# Patient Record
Sex: FEMALE | Race: OTHER | HISPANIC OR LATINO | Employment: UNEMPLOYED | URBAN - METROPOLITAN AREA
[De-identification: names, ages, dates, MRNs, and addresses within clinical notes are randomized per-mention and may not be internally consistent; named-entity substitution may affect disease eponyms.]

---

## 2018-03-29 ENCOUNTER — HISTORICAL (OUTPATIENT)
Dept: RADIOLOGY | Facility: HOSPITAL | Age: 43
End: 2018-03-29

## 2018-07-27 ENCOUNTER — HISTORICAL (OUTPATIENT)
Dept: RADIOLOGY | Facility: HOSPITAL | Age: 43
End: 2018-07-27

## 2023-08-13 ENCOUNTER — HOSPITAL ENCOUNTER (OUTPATIENT)
Facility: HOSPITAL | Age: 48
Discharge: HOME | End: 2023-08-15
Attending: EMERGENCY MEDICINE | Admitting: SURGERY

## 2023-08-13 DIAGNOSIS — K37 APPENDICITIS, UNSPECIFIED APPENDICITIS TYPE: Primary | ICD-10-CM

## 2023-08-13 LAB
ALBUMIN SERPL-MCNC: 3.8 G/DL (ref 3.5–5)
ALBUMIN/GLOB SERPL: 1.1 RATIO (ref 1.1–2)
ALP SERPL-CCNC: 103 UNIT/L (ref 40–150)
ALT SERPL-CCNC: 38 UNIT/L (ref 0–55)
AMPHET UR QL SCN: NEGATIVE
APPEARANCE UR: CLEAR
AST SERPL-CCNC: 31 UNIT/L (ref 5–34)
B-HCG UR QL: NEGATIVE
BACTERIA #/AREA URNS AUTO: ABNORMAL /HPF
BARBITURATE SCN PRESENT UR: NEGATIVE
BASOPHILS # BLD AUTO: 0.02 X10(3)/MCL
BASOPHILS NFR BLD AUTO: 0.1 %
BENZODIAZ UR QL SCN: NEGATIVE
BILIRUB SERPL-MCNC: 0.7 MG/DL
BILIRUB UR QL STRIP.AUTO: NEGATIVE
BUN SERPL-MCNC: 12.3 MG/DL (ref 7–18.7)
CALCIUM SERPL-MCNC: 9.3 MG/DL (ref 8.4–10.2)
CANNABINOIDS UR QL SCN: NEGATIVE
CHLORIDE SERPL-SCNC: 109 MMOL/L (ref 98–107)
CO2 SERPL-SCNC: 18 MMOL/L (ref 22–29)
COCAINE UR QL SCN: NEGATIVE
COLOR UR: ABNORMAL
CREAT SERPL-MCNC: 0.72 MG/DL (ref 0.55–1.02)
CTP QC/QA: YES
EOSINOPHIL # BLD AUTO: 0.01 X10(3)/MCL (ref 0–0.9)
EOSINOPHIL NFR BLD AUTO: 0.1 %
ERYTHROCYTE [DISTWIDTH] IN BLOOD BY AUTOMATED COUNT: 12.1 % (ref 11.5–17)
FENTANYL UR QL SCN: NEGATIVE
GFR SERPLBLD CREATININE-BSD FMLA CKD-EPI: >60 MLS/MIN/1.73/M2
GLOBULIN SER-MCNC: 3.6 GM/DL (ref 2.4–3.5)
GLUCOSE SERPL-MCNC: 119 MG/DL (ref 74–100)
GLUCOSE UR QL STRIP.AUTO: NORMAL
HCT VFR BLD AUTO: 43.6 % (ref 37–47)
HGB BLD-MCNC: 15.7 G/DL (ref 12–16)
HYALINE CASTS #/AREA URNS LPF: ABNORMAL /LPF
IMM GRANULOCYTES # BLD AUTO: 0.05 X10(3)/MCL (ref 0–0.04)
IMM GRANULOCYTES NFR BLD AUTO: 0.3 %
KETONES UR QL STRIP.AUTO: NEGATIVE
LEUKOCYTE ESTERASE UR QL STRIP.AUTO: NEGATIVE
LIPASE SERPL-CCNC: 19 U/L
LYMPHOCYTES # BLD AUTO: 1.57 X10(3)/MCL (ref 0.6–4.6)
LYMPHOCYTES NFR BLD AUTO: 10.7 %
MCH RBC QN AUTO: 29.8 PG (ref 27–31)
MCHC RBC AUTO-ENTMCNC: 36 G/DL (ref 33–36)
MCV RBC AUTO: 82.7 FL (ref 80–94)
MDMA UR QL SCN: NEGATIVE
MONOCYTES # BLD AUTO: 0.58 X10(3)/MCL (ref 0.1–1.3)
MONOCYTES NFR BLD AUTO: 4 %
MUCOUS THREADS URNS QL MICRO: ABNORMAL /LPF
NEUTROPHILS # BLD AUTO: 12.38 X10(3)/MCL (ref 2.1–9.2)
NEUTROPHILS NFR BLD AUTO: 84.8 %
NITRITE UR QL STRIP.AUTO: NEGATIVE
NRBC BLD AUTO-RTO: 0 %
OPIATES UR QL SCN: NEGATIVE
PCP UR QL: NEGATIVE
PH UR STRIP.AUTO: 6 [PH]
PH UR: 6 [PH] (ref 3–11)
PLATELET # BLD AUTO: 202 X10(3)/MCL (ref 130–400)
PMV BLD AUTO: 10.5 FL (ref 7.4–10.4)
POTASSIUM SERPL-SCNC: 3.5 MMOL/L (ref 3.5–5.1)
PROT SERPL-MCNC: 7.4 GM/DL (ref 6.4–8.3)
PROT UR QL STRIP.AUTO: ABNORMAL
RBC # BLD AUTO: 5.27 X10(6)/MCL (ref 4.2–5.4)
RBC #/AREA URNS AUTO: ABNORMAL /HPF
RBC UR QL AUTO: ABNORMAL
SODIUM SERPL-SCNC: 138 MMOL/L (ref 136–145)
SP GR UR STRIP.AUTO: 1.02
SQUAMOUS #/AREA URNS LPF: ABNORMAL /HPF
UROBILINOGEN UR STRIP-ACNC: NORMAL
WBC # SPEC AUTO: 14.61 X10(3)/MCL (ref 4.5–11.5)
WBC #/AREA URNS AUTO: ABNORMAL /HPF

## 2023-08-13 PROCEDURE — 25000003 PHARM REV CODE 250: Performed by: NURSE PRACTITIONER

## 2023-08-13 PROCEDURE — 80053 COMPREHEN METABOLIC PANEL: CPT | Performed by: NURSE PRACTITIONER

## 2023-08-13 PROCEDURE — 25500020 PHARM REV CODE 255

## 2023-08-13 PROCEDURE — 96375 TX/PRO/DX INJ NEW DRUG ADDON: CPT

## 2023-08-13 PROCEDURE — 81001 URINALYSIS AUTO W/SCOPE: CPT | Performed by: NURSE PRACTITIONER

## 2023-08-13 PROCEDURE — 25000003 PHARM REV CODE 250: Performed by: STUDENT IN AN ORGANIZED HEALTH CARE EDUCATION/TRAINING PROGRAM

## 2023-08-13 PROCEDURE — 63600175 PHARM REV CODE 636 W HCPCS: Performed by: NURSE PRACTITIONER

## 2023-08-13 PROCEDURE — 96365 THER/PROPH/DIAG IV INF INIT: CPT

## 2023-08-13 PROCEDURE — 63600175 PHARM REV CODE 636 W HCPCS: Performed by: STUDENT IN AN ORGANIZED HEALTH CARE EDUCATION/TRAINING PROGRAM

## 2023-08-13 PROCEDURE — 81025 URINE PREGNANCY TEST: CPT | Performed by: NURSE PRACTITIONER

## 2023-08-13 PROCEDURE — 80307 DRUG TEST PRSMV CHEM ANLYZR: CPT | Performed by: NURSE PRACTITIONER

## 2023-08-13 PROCEDURE — 99285 EMERGENCY DEPT VISIT HI MDM: CPT | Mod: 25

## 2023-08-13 PROCEDURE — 85025 COMPLETE CBC W/AUTO DIFF WBC: CPT | Performed by: NURSE PRACTITIONER

## 2023-08-13 PROCEDURE — 83690 ASSAY OF LIPASE: CPT | Performed by: NURSE PRACTITIONER

## 2023-08-13 RX ORDER — ENOXAPARIN SODIUM 100 MG/ML
40 INJECTION SUBCUTANEOUS EVERY 24 HOURS
Status: DISCONTINUED | OUTPATIENT
Start: 2023-08-13 | End: 2023-08-14

## 2023-08-13 RX ORDER — OXYCODONE HYDROCHLORIDE 5 MG/1
5 TABLET ORAL EVERY 4 HOURS PRN
Status: DISCONTINUED | OUTPATIENT
Start: 2023-08-13 | End: 2023-08-15 | Stop reason: HOSPADM

## 2023-08-13 RX ORDER — SODIUM CHLORIDE, SODIUM LACTATE, POTASSIUM CHLORIDE, CALCIUM CHLORIDE 600; 310; 30; 20 MG/100ML; MG/100ML; MG/100ML; MG/100ML
INJECTION, SOLUTION INTRAVENOUS CONTINUOUS
Status: DISCONTINUED | OUTPATIENT
Start: 2023-08-13 | End: 2023-08-13

## 2023-08-13 RX ORDER — KETOROLAC TROMETHAMINE 30 MG/ML
15 INJECTION, SOLUTION INTRAMUSCULAR; INTRAVENOUS
Status: COMPLETED | OUTPATIENT
Start: 2023-08-13 | End: 2023-08-13

## 2023-08-13 RX ORDER — ONDANSETRON 2 MG/ML
4 INJECTION INTRAMUSCULAR; INTRAVENOUS EVERY 6 HOURS PRN
Status: DISCONTINUED | OUTPATIENT
Start: 2023-08-13 | End: 2023-08-15 | Stop reason: HOSPADM

## 2023-08-13 RX ORDER — SODIUM CHLORIDE, SODIUM LACTATE, POTASSIUM CHLORIDE, CALCIUM CHLORIDE 600; 310; 30; 20 MG/100ML; MG/100ML; MG/100ML; MG/100ML
INJECTION, SOLUTION INTRAVENOUS CONTINUOUS
Status: DISCONTINUED | OUTPATIENT
Start: 2023-08-14 | End: 2023-08-14

## 2023-08-13 RX ORDER — MAG HYDROX/ALUMINUM HYD/SIMETH 200-200-20
15 SUSPENSION, ORAL (FINAL DOSE FORM) ORAL
Status: COMPLETED | OUTPATIENT
Start: 2023-08-13 | End: 2023-08-13

## 2023-08-13 RX ORDER — ONDANSETRON 2 MG/ML
4 INJECTION INTRAMUSCULAR; INTRAVENOUS
Status: COMPLETED | OUTPATIENT
Start: 2023-08-13 | End: 2023-08-13

## 2023-08-13 RX ORDER — ACETAMINOPHEN 325 MG/1
650 TABLET ORAL EVERY 4 HOURS
Status: DISCONTINUED | OUTPATIENT
Start: 2023-08-13 | End: 2023-08-15 | Stop reason: HOSPADM

## 2023-08-13 RX ORDER — HEPARIN SODIUM 5000 [USP'U]/ML
5000 INJECTION, SOLUTION INTRAVENOUS; SUBCUTANEOUS
Status: DISCONTINUED | OUTPATIENT
Start: 2023-08-13 | End: 2023-08-15 | Stop reason: HOSPADM

## 2023-08-13 RX ORDER — TALC
6 POWDER (GRAM) TOPICAL NIGHTLY PRN
Status: DISCONTINUED | OUTPATIENT
Start: 2023-08-13 | End: 2023-08-15 | Stop reason: HOSPADM

## 2023-08-13 RX ORDER — ONDANSETRON 4 MG/1
4 TABLET, ORALLY DISINTEGRATING ORAL EVERY 6 HOURS PRN
Status: DISCONTINUED | OUTPATIENT
Start: 2023-08-13 | End: 2023-08-15 | Stop reason: HOSPADM

## 2023-08-13 RX ORDER — ACETAMINOPHEN 325 MG/1
650 TABLET ORAL EVERY 8 HOURS PRN
Status: DISCONTINUED | OUTPATIENT
Start: 2023-08-13 | End: 2023-08-15 | Stop reason: HOSPADM

## 2023-08-13 RX ORDER — PANTOPRAZOLE SODIUM 40 MG/1
40 TABLET, DELAYED RELEASE ORAL DAILY
Status: DISCONTINUED | OUTPATIENT
Start: 2023-08-14 | End: 2023-08-14

## 2023-08-13 RX ORDER — OXYCODONE HYDROCHLORIDE 5 MG/1
10 TABLET ORAL EVERY 4 HOURS PRN
Status: DISCONTINUED | OUTPATIENT
Start: 2023-08-13 | End: 2023-08-15 | Stop reason: HOSPADM

## 2023-08-13 RX ORDER — CEFAZOLIN SODIUM 2 G/50ML
2 SOLUTION INTRAVENOUS
Status: DISCONTINUED | OUTPATIENT
Start: 2023-08-13 | End: 2023-08-15 | Stop reason: HOSPADM

## 2023-08-13 RX ORDER — LIDOCAINE HYDROCHLORIDE 20 MG/ML
10 SOLUTION OROPHARYNGEAL
Status: DISCONTINUED | OUTPATIENT
Start: 2023-08-13 | End: 2023-08-13

## 2023-08-13 RX ADMIN — ACETAMINOPHEN 650 MG: 325 TABLET, FILM COATED ORAL at 06:08

## 2023-08-13 RX ADMIN — SODIUM CHLORIDE, POTASSIUM CHLORIDE, SODIUM LACTATE AND CALCIUM CHLORIDE: 600; 310; 30; 20 INJECTION, SOLUTION INTRAVENOUS at 11:08

## 2023-08-13 RX ADMIN — ENOXAPARIN SODIUM 40 MG: 40 INJECTION SUBCUTANEOUS at 06:08

## 2023-08-13 RX ADMIN — ALUMINUM HYDROXIDE, MAGNESIUM HYDROXIDE, AND DIMETHICONE 15 ML: 200; 20; 200 SUSPENSION ORAL at 02:08

## 2023-08-13 RX ADMIN — PIPERACILLIN AND TAZOBACTAM 4.5 G: 4; .5 INJECTION, POWDER, LYOPHILIZED, FOR SOLUTION INTRAVENOUS; PARENTERAL at 06:08

## 2023-08-13 RX ADMIN — ACETAMINOPHEN 650 MG: 325 TABLET, FILM COATED ORAL at 10:08

## 2023-08-13 RX ADMIN — ONDANSETRON 4 MG: 2 INJECTION INTRAMUSCULAR; INTRAVENOUS at 04:08

## 2023-08-13 RX ADMIN — SODIUM CHLORIDE 1000 ML: 9 INJECTION, SOLUTION INTRAVENOUS at 04:08

## 2023-08-13 RX ADMIN — IOHEXOL 100 ML: 350 INJECTION, SOLUTION INTRAVENOUS at 04:08

## 2023-08-13 RX ADMIN — KETOROLAC TROMETHAMINE 15 MG: 30 INJECTION, SOLUTION INTRAMUSCULAR; INTRAVENOUS at 04:08

## 2023-08-13 NOTE — MEDICAL/APP STUDENT
"   Acute Care Surgery   History and Physical Note    Patient Name: Cassandra Coleman  YOB: 1975  Date: 08/13/2023 6:00 PM  Date of Admission: 8/13/2023  HD#0  POD#* No surgery found *    PRESENTING HISTORY   Chief Complaint/Reason for Admission: <principal problem not specified>    History of Present Illness: 47 y.o. female with history of gastritis presents to the ED with epigastric abdominal pain. Surgery consulted for acute appendicitis on CT abd.   Pt reports she started having epigastric discomfort 2 weeks ago. Her pain became worse early this morning with associated nausea and bilious, nonbloody vomiting. Pain also now radiates to lower abdomen. She notes that pain is similar to her gastritis, but much more severe. She took her "gastritis medications" at home but it did not alleviate her pain. Denies fever, chills, HA, dizziness. Denies diarrhea, constipation, or urination changes.  Pt had workup for epigastric pain ~2-3 years ago with endoscopy and colonoscopy. Workup at that time showed ulcers and she was treated for gastritis. Colonoscopy at that time showed no abnormalities.     Review of Systems:  12 point ROS negative except as stated in HPI    PAST HISTORY:   Past medical history:  History reviewed. No pertinent past medical history.    Past surgical history:  Cholecystectomy (~11 years ago)    Family history:  History reviewed. No pertinent family history.    Social history:  Social History     Socioeconomic History    Marital status:      Social History     Tobacco Use   Smoking Status Not on file   Smokeless Tobacco Not on file      Social History     Substance and Sexual Activity   Alcohol Use None        MEDICATIONS & ALLERGIES:     No current facility-administered medications on file prior to encounter.     No current outpatient medications on file prior to encounter.       Allergies: Review of patient's allergies indicates:  No Known Allergies    Scheduled " "Meds:    Continuous Infusions:    PRN Meds:    OBJECTIVE:   Vital Signs:  VITAL SIGNS: 24 HR MIN & MAX LAST   Temp  Min: 98.2 °F (36.8 °C)  Max: 98.2 °F (36.8 °C)  98.2 °F (36.8 °C)   BP  Min: 114/61  Max: 124/71  114/61    Pulse  Min: 59  Max: 80  60    Resp  Min: 16  Max: 18  18    SpO2  Min: 100 %  Max: 100 %  100 %      HT: 5' 4.96" (165 cm)  WT: 88 kg (194 lb 0.1 oz)  BMI: 32.3     Intake/output:  Intake/Output - Last 3 Shifts         08/11 0700 08/12 0659 08/12 0700 08/13 0659 08/13 0700 08/14 0659    IV Piggyback   248.4    Total Intake(mL/kg)   248.4 (2.8)    Net   +248.4                   Intake/Output Summary (Last 24 hours) at 8/13/2023 1931  Last data filed at 8/13/2023 1846  Gross per 24 hour   Intake 248.41 ml   Output --   Net 248.41 ml         Physical Exam:  General: Well developed, well nourished, no acute distress  HEENT: Normocephalic, atraumatic, PERRL  CV: RR  Resp: NWOB  GI:  Abdomen soft, non-distended, no guarding, no rebound, normoactive bowel sounds, no masses. RLQ tenderness and epigastric tenderness  :  Deferred  MSK: No muscle atrophy, cyanosis, peripheral edema, moving all extremities spontaneously  Skin/wounds:  No rashes, ulcers, erythema  Neuro: alert and oriented to person, place, and time    Labs:  Troponin:  No results for input(s): "TROPONINI" in the last 72 hours.  CBC:  Recent Labs     08/13/23  1408   WBC 14.61*   RBC 5.27   HGB 15.7   HCT 43.6      MCV 82.7   MCH 29.8   MCHC 36.0     CMP:  Recent Labs     08/13/23  1408   CALCIUM 9.3   ALBUMIN 3.8      K 3.5   CO2 18*   BUN 12.3   CREATININE 0.72   ALKPHOS 103   ALT 38   AST 31   BILITOT 0.7     Lactic Acid:  No results for input(s): "LACTATE" in the last 72 hours.  ETOH:  No results for input(s): "ETHANOL" in the last 72 hours.   Urine Drug Screen:  Recent Labs     08/13/23  1410   COCAINE Negative   OPIATE Negative   FENTANYL Negative   MDMA Negative      ABG:  No results for input(s): "PH", "PCO2", " ""PO2", "HCO3", "BE", "POCSATURATED" in the last 72 hours.    Diagnostic Results:  CT Abdomen Pelvis With Contrast   Final Result      Findings concerning for acute appendicitis.  No evidence of abscess or perforation.      Findings reported to Dr. AWAD in the  ER prior to interpretation.         Electronically signed by: Edward Higginbotham   Date:    08/13/2023   Time:    17:02          ASSESSMENT & PLAN:    47 y.o. female with hx of gastritis admitted for acute appendicitis. Physical exam with +Mcburney's point. CT findings concerning for acute appendicitis. No evidence of abscess or perforation. Currently afebrile, VSS, HDS. Plan for appendectomy in the morning.     - MMPC   - Diet: NPO after midnight  - Anti-emesis: Odansetron   - Bowel regimen: n/a  - PT/OT    Tasha Linares MS4  [facility]-NO JASON    "

## 2023-08-13 NOTE — ED PROVIDER NOTES
Encounter Date: 8/13/2023       History     Chief Complaint   Patient presents with    Nausea    Vomiting     Pt reports midline abdominal pain with nausea and vomiting.      The patient presents with abdominal pain.  The onset was 2 weeks ago increased today.  The course/duration of symptoms is constant and fluctuating in intensity.  The character of symptoms is dull.  The degree at onset was minimal.  The Location of pain at onset was epigastric.  The degree at present is moderate.  The Location of pain at present is epigastric.  Radiating pain: none. There are exacerbating factors including eating spicy foods such as chili.  The relieving factor is none.  Therapy today: none.  Risk factors consist of history of acid relux.  Associated symptoms: nausea, acid reflux, denies chest pain, denies vomiting, denies diarrhea, denies back pain, denies shortness of breath, denies fever, denies chills, denies headache and denies dizziness. Video  used for encounter.      Review of patient's allergies indicates:  No Known Allergies  History reviewed. No pertinent past medical history.  History reviewed. No pertinent surgical history.  History reviewed. No pertinent family history.     Review of Systems   Constitutional:  Negative for fever.   HENT:  Negative for sore throat.    Respiratory:  Negative for shortness of breath.    Cardiovascular:  Negative for chest pain.   Gastrointestinal:  Positive for abdominal pain and nausea.   Genitourinary:  Negative for dysuria.   Musculoskeletal:  Negative for back pain.   Skin:  Negative for rash.   Neurological:  Negative for weakness.   Hematological:  Does not bruise/bleed easily.   All other systems reviewed and are negative.      Physical Exam     Initial Vitals [08/13/23 1331]   BP Pulse Resp Temp SpO2   124/71 80 18 98.2 °F (36.8 °C) 100 %      MAP       --         Physical Exam    Nursing note and vitals reviewed.  Constitutional: She appears well-developed and  well-nourished.   HENT:   Head: Normocephalic and atraumatic.   Neck: Neck supple.   Normal range of motion.  Cardiovascular:  Normal rate, regular rhythm, normal heart sounds and intact distal pulses.           Pulmonary/Chest: Breath sounds normal.   Abdominal: Abdomen is soft. Bowel sounds are normal. There is abdominal tenderness in the right lower quadrant and epigastric area.   Musculoskeletal:         General: Normal range of motion.      Cervical back: Normal range of motion and neck supple.     Neurological: She is alert. She has normal strength.   Skin: Skin is warm and dry.   Psychiatric: She has a normal mood and affect.         ED Course   Procedures  Labs Reviewed   COMPREHENSIVE METABOLIC PANEL - Abnormal; Notable for the following components:       Result Value    Chloride 109 (*)     Carbon Dioxide 18 (*)     Glucose Level 119 (*)     Globulin 3.6 (*)     All other components within normal limits   URINALYSIS, REFLEX TO URINE CULTURE - Abnormal; Notable for the following components:    Protein, UA Trace (*)     Blood, UA 2+ (*)     Squamous Epithelial Cells, UA Occ (*)     Mucous, UA Trace (*)     Hyaline Casts, UA 0-2 (*)     RBC, UA 6-10 (*)     All other components within normal limits   CBC WITH DIFFERENTIAL - Abnormal; Notable for the following components:    WBC 14.61 (*)     MPV 10.5 (*)     Neut # 12.38 (*)     IG# 0.05 (*)     All other components within normal limits   LIPASE - Normal   DRUG SCREEN, URINE (BEAKER) - Normal    Narrative:     Cut off concentrations:    Amphetamines - 1000 ng/ml  Barbiturates - 200 ng/ml  Benzodiazepine - 200 ng/ml  Cannabinoids (THC) - 50 ng/ml  Cocaine - 300 ng/ml  Fentanyl - 1.0 ng/ml  MDMA - 500 ng/ml  Opiates - 300 ng/ml   Phencyclidine (PCP) - 25 ng/ml    Specimen submitted for drug analysis and tested for pH and specific gravity in order to evaluate sample integrity. Suspect tampering if specific gravity is <1.003 and/or pH is not within the range of  4.5 - 8.0  False negatives may result form substances such as bleach added to urine.  False positives may result for the presence of a substance with similar chemical structure to the drug or its metabolite.    This test provides only a PRELIMINARY analytical test result. A more specific alternate chemical method must be used in order to obtain a confirmed analytical result. Gas chromatography/mass spectrometry (GC/MS) is the preferred confirmatory method. Other chemical confirmation methods are available. Clinical consideration and professional judgement should be applied to any drug of abuse test result, particularly when preliminary positive results are used.    Positive results will be confirmed only at the physicians request. Unconfirmed screening results are to be used only for medical purposes (treatment).        CBC W/ AUTO DIFFERENTIAL    Narrative:     The following orders were created for panel order CBC auto differential.  Procedure                               Abnormality         Status                     ---------                               -----------         ------                     CBC with Differential[[id number]]        Abnormal            Final result                 Please view results for these tests on the individual orders.   EXTRA TUBES    Narrative:     The following orders were created for panel order EXTRA TUBES.  Procedure                               Abnormality         Status                     ---------                               -----------         ------                     Light Blue Top Hold[[id number]]                              In process                 Gold Top Hold[[id number]]                                    In process                   Please view results for these tests on the individual orders.   LIGHT BLUE TOP HOLD   GOLD TOP HOLD   POCT URINE PREGNANCY          Imaging Results              CT Abdomen Pelvis With Contrast (Final result)  Result time 08/13/23  17:02:17      Final result by Edward Higginbotham MD (08/13/23 17:02:17)                   Impression:      Findings concerning for acute appendicitis.  No evidence of abscess or perforation.    Findings reported to Dr. AWAD in the  ER prior to interpretation.      Electronically signed by: Edward Higginbotham  Date:    08/13/2023  Time:    17:02               Narrative:    EXAMINATION:  CT ABDOMEN PELVIS WITH CONTRAST    CLINICAL HISTORY:  RLQ abdominal pain (Age >= 14y);    TECHNIQUE:  Multidetector IV contrast enhanced axial CT images of the abdomen and pelvis were obtained with coronal and sagittal reconstructions.    Automatic exposure control was utilized to reduce the patient's radiation dose.    DLP= 476    COMPARISON:  No prior imaging available for comparison.    FINDINGS:  01. HEPATOBILIARY: No focal hepatic lesion is identified, The gallbladder is normal.    02. SPLEEN: Normal    03. PANCREAS: No focal masses or ductal dilatation.    04. ADRENALS: No adrenal nodules.    05. KIDNEYS: The right kidney demonstrates no stone, hydronephrosis, or hydroureter. No focal mass identified. The left kidney demonstrates no stone, hydronephrosis, or hydroureter. No focal mass identified.    06. LYMPHADENOPATHY/RETROPERITONEUM: There is no retroperitoneal lymphadenopathy. The abdominal aorta is normal in course and caliber.    07. BOWEL: The appendix is enlarged and hyperemic.    08. PELVIC VISCERA: Normal. No pelvic mass.    09. PELVIC LYMPH NODES: No lymphadenopathy.    10. PERITONEUM/ABDOMINAL WALL: No ascites or implant.    11. SKELETAL: No aggressive appearing lytic/blastic lesion. No acute fractures, subluxations or dislocations.    12. LUNG BASES: The visualized lungs are unremarkable.                                       Medications   aluminum-magnesium hydroxide-simethicone 200-200-20 mg/5 mL suspension 15 mL (15 mLs Oral Given 8/13/23 8535)   sodium chloride 0.9% bolus 1,000 mL 1,000 mL (1,000 mLs Intravenous New  Bag 8/13/23 1610)   ondansetron injection 4 mg (4 mg Intravenous Given 8/13/23 1611)   ketorolac injection 15 mg (15 mg Intravenous Given 8/13/23 1611)   iohexoL (OMNIPAQUE 350) 350 mg iodine/mL injection (100 mLs Intravenous Given 8/13/23 1630)     Medical Decision Making:   History:   Old Records Summarized: other records.       <> Summary of Records: No recent records to review.  Initial Assessment:   The patient presents with abdominal pain.  The onset was 3 days ago.  The course/duration of symptoms is constant and fluctuating in intensity.  The character of symptoms is dull.  The degree at onset was minimal.  The Location of pain at onset was epigastric and right lower quadrant.  The degree at present is moderate.  The Location of pain at present is epigastric and right lower quadrant  Radiating pain: none. There are exacerbating factors including eating spicy foods such as chili.  The relieving factor is none.  Therapy today: none.  Risk factors consist of history of acid relux.  Associated symptoms: nausea, acid reflux, denies chest pain, denies vomiting, denies diarrhea, denies back pain, denies shortness of breath, denies fever, denies chills, denies headache and denies dizziness. Video  used for encounter.    Differential Diagnosis:   Appendicitis, Diverticulitis, Pancreatitis, Pyelonephritis, AAA, Dissection, MI, Gastric Ulcer, Peptic Ulcer, Urinary retention, among others     Clinical Tests:   Lab Tests: Ordered and Reviewed  Radiological Study: Ordered and Reviewed  ED Management:  Ketorolac and zofran given per IV. Maalox given po.   Other:   I have discussed this case with another health care provider.       <> Summary of the Discussion: Consulted Dr Reed (ER staff) concerning this patient with acute appendicitis, consulted surgery team for admission                          Clinical Impression:   Final diagnoses:  [K37] Appendicitis, unspecified appendicitis type (Primary)        ED  Disposition Condition    Admit Stable                Stephane Santiago, [facility]  08/13/23 3610

## 2023-08-14 ENCOUNTER — ANESTHESIA (OUTPATIENT)
Dept: SURGERY | Facility: HOSPITAL | Age: 48
End: 2023-08-14
Payer: MEDICAID

## 2023-08-14 ENCOUNTER — ANESTHESIA EVENT (OUTPATIENT)
Dept: SURGERY | Facility: HOSPITAL | Age: 48
End: 2023-08-14

## 2023-08-14 LAB
ANION GAP SERPL CALC-SCNC: 8 MEQ/L
BASOPHILS # BLD AUTO: 0.02 X10(3)/MCL
BASOPHILS NFR BLD AUTO: 0.2 %
BUN SERPL-MCNC: 10.3 MG/DL (ref 7–18.7)
CALCIUM SERPL-MCNC: 8.6 MG/DL (ref 8.4–10.2)
CHLORIDE SERPL-SCNC: 112 MMOL/L (ref 98–107)
CO2 SERPL-SCNC: 23 MMOL/L (ref 22–29)
CREAT SERPL-MCNC: 0.75 MG/DL (ref 0.55–1.02)
CREAT/UREA NIT SERPL: 14
EOSINOPHIL # BLD AUTO: 0.06 X10(3)/MCL (ref 0–0.9)
EOSINOPHIL NFR BLD AUTO: 0.7 %
ERYTHROCYTE [DISTWIDTH] IN BLOOD BY AUTOMATED COUNT: 12.6 % (ref 11.5–17)
ERYTHROCYTE [DISTWIDTH] IN BLOOD BY AUTOMATED COUNT: 13 % (ref 11.5–17)
GFR SERPLBLD CREATININE-BSD FMLA CKD-EPI: >60 MLS/MIN/1.73/M2
GLUCOSE SERPL-MCNC: 105 MG/DL (ref 74–100)
GROUP & RH: NORMAL
HCT VFR BLD AUTO: 37 % (ref 37–47)
HCT VFR BLD AUTO: 39.9 % (ref 37–47)
HGB BLD-MCNC: 13.1 G/DL (ref 12–16)
HGB BLD-MCNC: 13.7 G/DL (ref 12–16)
IMM GRANULOCYTES # BLD AUTO: 0.03 X10(3)/MCL (ref 0–0.04)
IMM GRANULOCYTES NFR BLD AUTO: 0.4 %
INDIRECT COOMBS GEL: NORMAL
LYMPHOCYTES # BLD AUTO: 3.38 X10(3)/MCL (ref 0.6–4.6)
LYMPHOCYTES NFR BLD AUTO: 39.7 %
MAGNESIUM SERPL-MCNC: 1.8 MG/DL (ref 1.6–2.6)
MCH RBC QN AUTO: 29.3 PG (ref 27–31)
MCH RBC QN AUTO: 30.5 PG (ref 27–31)
MCHC RBC AUTO-ENTMCNC: 34.3 G/DL (ref 33–36)
MCHC RBC AUTO-ENTMCNC: 35.4 G/DL (ref 33–36)
MCV RBC AUTO: 85.3 FL (ref 80–94)
MCV RBC AUTO: 86.2 FL (ref 80–94)
MONOCYTES # BLD AUTO: 0.83 X10(3)/MCL (ref 0.1–1.3)
MONOCYTES NFR BLD AUTO: 9.8 %
NEUTROPHILS # BLD AUTO: 4.19 X10(3)/MCL (ref 2.1–9.2)
NEUTROPHILS NFR BLD AUTO: 49.2 %
NRBC BLD AUTO-RTO: 0 %
NRBC BLD AUTO-RTO: 0 %
PHOSPHATE SERPL-MCNC: 3.9 MG/DL (ref 2.3–4.7)
PLATELET # BLD AUTO: 162 X10(3)/MCL (ref 130–400)
PLATELET # BLD AUTO: 180 X10(3)/MCL (ref 130–400)
PMV BLD AUTO: 10.4 FL (ref 7.4–10.4)
PMV BLD AUTO: 10.5 FL (ref 7.4–10.4)
POTASSIUM SERPL-SCNC: 3.4 MMOL/L (ref 3.5–5.1)
RBC # BLD AUTO: 4.29 X10(6)/MCL (ref 4.2–5.4)
RBC # BLD AUTO: 4.68 X10(6)/MCL (ref 4.2–5.4)
SODIUM SERPL-SCNC: 143 MMOL/L (ref 136–145)
SPECIMEN OUTDATE: NORMAL
WBC # SPEC AUTO: 10.75 X10(3)/MCL (ref 4.5–11.5)
WBC # SPEC AUTO: 8.51 X10(3)/MCL (ref 4.5–11.5)

## 2023-08-14 PROCEDURE — 94799 UNLISTED PULMONARY SVC/PX: CPT

## 2023-08-14 PROCEDURE — D9220A PRA ANESTHESIA: Mod: ,,, | Performed by: ANESTHESIOLOGY

## 2023-08-14 PROCEDURE — 25000003 PHARM REV CODE 250: Performed by: NURSE ANESTHETIST, CERTIFIED REGISTERED

## 2023-08-14 PROCEDURE — 71000033 HC RECOVERY, INTIAL HOUR: Performed by: SURGERY

## 2023-08-14 PROCEDURE — 25000003 PHARM REV CODE 250: Performed by: STUDENT IN AN ORGANIZED HEALTH CARE EDUCATION/TRAINING PROGRAM

## 2023-08-14 PROCEDURE — 85027 COMPLETE CBC AUTOMATED: CPT | Performed by: STUDENT IN AN ORGANIZED HEALTH CARE EDUCATION/TRAINING PROGRAM

## 2023-08-14 PROCEDURE — D9220A PRA ANESTHESIA: ICD-10-PCS | Mod: ,,, | Performed by: ANESTHESIOLOGY

## 2023-08-14 PROCEDURE — 63600175 PHARM REV CODE 636 W HCPCS: Performed by: NURSE ANESTHETIST, CERTIFIED REGISTERED

## 2023-08-14 PROCEDURE — 88304 TISSUE EXAM BY PATHOLOGIST: CPT | Mod: TC | Performed by: SURGERY

## 2023-08-14 PROCEDURE — 37000009 HC ANESTHESIA EA ADD 15 MINS: Performed by: SURGERY

## 2023-08-14 PROCEDURE — 63600175 PHARM REV CODE 636 W HCPCS: Performed by: STUDENT IN AN ORGANIZED HEALTH CARE EDUCATION/TRAINING PROGRAM

## 2023-08-14 PROCEDURE — 63600175 PHARM REV CODE 636 W HCPCS: Performed by: ANESTHESIOLOGY

## 2023-08-14 PROCEDURE — 94761 N-INVAS EAR/PLS OXIMETRY MLT: CPT

## 2023-08-14 PROCEDURE — 27201423 OPTIME MED/SURG SUP & DEVICES STERILE SUPPLY: Performed by: SURGERY

## 2023-08-14 PROCEDURE — 37000008 HC ANESTHESIA 1ST 15 MINUTES: Performed by: SURGERY

## 2023-08-14 PROCEDURE — 63600175 PHARM REV CODE 636 W HCPCS: Performed by: SURGERY

## 2023-08-14 PROCEDURE — 36000709 HC OR TIME LEV III EA ADD 15 MIN: Performed by: SURGERY

## 2023-08-14 PROCEDURE — 83735 ASSAY OF MAGNESIUM: CPT | Performed by: STUDENT IN AN ORGANIZED HEALTH CARE EDUCATION/TRAINING PROGRAM

## 2023-08-14 PROCEDURE — 85025 COMPLETE CBC W/AUTO DIFF WBC: CPT | Performed by: STUDENT IN AN ORGANIZED HEALTH CARE EDUCATION/TRAINING PROGRAM

## 2023-08-14 PROCEDURE — 84100 ASSAY OF PHOSPHORUS: CPT | Performed by: STUDENT IN AN ORGANIZED HEALTH CARE EDUCATION/TRAINING PROGRAM

## 2023-08-14 PROCEDURE — 80048 BASIC METABOLIC PNL TOTAL CA: CPT | Performed by: STUDENT IN AN ORGANIZED HEALTH CARE EDUCATION/TRAINING PROGRAM

## 2023-08-14 PROCEDURE — 36000708 HC OR TIME LEV III 1ST 15 MIN: Performed by: SURGERY

## 2023-08-14 PROCEDURE — 86900 BLOOD TYPING SEROLOGIC ABO: CPT | Performed by: SURGERY

## 2023-08-14 RX ORDER — LIDOCAINE HYDROCHLORIDE 20 MG/ML
INJECTION INTRAVENOUS
Status: DISCONTINUED | OUTPATIENT
Start: 2023-08-14 | End: 2023-08-14

## 2023-08-14 RX ORDER — BUPIVACAINE HYDROCHLORIDE 2.5 MG/ML
INJECTION, SOLUTION EPIDURAL; INFILTRATION; INTRACAUDAL
Status: DISCONTINUED | OUTPATIENT
Start: 2023-08-14 | End: 2023-08-14 | Stop reason: HOSPADM

## 2023-08-14 RX ORDER — EPHEDRINE SULFATE 50 MG/ML
INJECTION, SOLUTION INTRAVENOUS
Status: DISCONTINUED | OUTPATIENT
Start: 2023-08-14 | End: 2023-08-14

## 2023-08-14 RX ORDER — MEPERIDINE HYDROCHLORIDE 25 MG/ML
12.5 INJECTION INTRAMUSCULAR; INTRAVENOUS; SUBCUTANEOUS EVERY 10 MIN PRN
Status: DISCONTINUED | OUTPATIENT
Start: 2023-08-14 | End: 2023-08-14

## 2023-08-14 RX ORDER — SUCCINYLCHOLINE CHLORIDE 20 MG/ML
INJECTION INTRAMUSCULAR; INTRAVENOUS
Status: DISCONTINUED | OUTPATIENT
Start: 2023-08-14 | End: 2023-08-14

## 2023-08-14 RX ORDER — OXYCODONE HYDROCHLORIDE 5 MG/1
5 TABLET ORAL
Status: DISCONTINUED | OUTPATIENT
Start: 2023-08-14 | End: 2023-08-14

## 2023-08-14 RX ORDER — PANTOPRAZOLE SODIUM 40 MG/1
40 TABLET, DELAYED RELEASE ORAL 2 TIMES DAILY
Status: DISCONTINUED | OUTPATIENT
Start: 2023-08-14 | End: 2023-08-15 | Stop reason: HOSPADM

## 2023-08-14 RX ORDER — ONDANSETRON 2 MG/ML
INJECTION INTRAMUSCULAR; INTRAVENOUS
Status: DISPENSED
Start: 2023-08-14 | End: 2023-08-14

## 2023-08-14 RX ORDER — SODIUM CHLORIDE 0.9 % (FLUSH) 0.9 %
10 SYRINGE (ML) INJECTION
Status: DISCONTINUED | OUTPATIENT
Start: 2023-08-14 | End: 2023-08-15 | Stop reason: HOSPADM

## 2023-08-14 RX ORDER — ROCURONIUM BROMIDE 10 MG/ML
INJECTION, SOLUTION INTRAVENOUS
Status: DISCONTINUED | OUTPATIENT
Start: 2023-08-14 | End: 2023-08-14

## 2023-08-14 RX ORDER — DEXAMETHASONE SODIUM PHOSPHATE 4 MG/ML
INJECTION, SOLUTION INTRA-ARTICULAR; INTRALESIONAL; INTRAMUSCULAR; INTRAVENOUS; SOFT TISSUE
Status: DISCONTINUED | OUTPATIENT
Start: 2023-08-14 | End: 2023-08-14

## 2023-08-14 RX ORDER — ONDANSETRON 2 MG/ML
4 INJECTION INTRAMUSCULAR; INTRAVENOUS DAILY PRN
Status: DISCONTINUED | OUTPATIENT
Start: 2023-08-14 | End: 2023-08-14

## 2023-08-14 RX ORDER — MORPHINE SULFATE 2 MG/ML
2 INJECTION, SOLUTION INTRAMUSCULAR; INTRAVENOUS EVERY 5 MIN PRN
Status: DISCONTINUED | OUTPATIENT
Start: 2023-08-14 | End: 2023-08-14

## 2023-08-14 RX ORDER — HYDROMORPHONE HYDROCHLORIDE 1 MG/ML
INJECTION, SOLUTION INTRAMUSCULAR; INTRAVENOUS; SUBCUTANEOUS
Status: DISCONTINUED | OUTPATIENT
Start: 2023-08-14 | End: 2023-08-14

## 2023-08-14 RX ORDER — ONDANSETRON 2 MG/ML
INJECTION INTRAMUSCULAR; INTRAVENOUS
Status: DISCONTINUED | OUTPATIENT
Start: 2023-08-14 | End: 2023-08-14

## 2023-08-14 RX ORDER — KETOROLAC TROMETHAMINE 30 MG/ML
INJECTION, SOLUTION INTRAMUSCULAR; INTRAVENOUS
Status: DISCONTINUED | OUTPATIENT
Start: 2023-08-14 | End: 2023-08-14

## 2023-08-14 RX ORDER — METHOCARBAMOL 500 MG/1
500 TABLET, FILM COATED ORAL 4 TIMES DAILY
Status: DISCONTINUED | OUTPATIENT
Start: 2023-08-14 | End: 2023-08-15 | Stop reason: HOSPADM

## 2023-08-14 RX ORDER — MIDAZOLAM HYDROCHLORIDE 1 MG/ML
2 INJECTION INTRAMUSCULAR; INTRAVENOUS ONCE AS NEEDED
Status: COMPLETED | OUTPATIENT
Start: 2023-08-14 | End: 2023-08-14

## 2023-08-14 RX ORDER — PROPOFOL 10 MG/ML
VIAL (ML) INTRAVENOUS
Status: DISCONTINUED | OUTPATIENT
Start: 2023-08-14 | End: 2023-08-14

## 2023-08-14 RX ORDER — POTASSIUM CHLORIDE 20 MEQ/1
40 TABLET, EXTENDED RELEASE ORAL ONCE
Status: COMPLETED | OUTPATIENT
Start: 2023-08-14 | End: 2023-08-14

## 2023-08-14 RX ORDER — MAGNESIUM SULFATE 1 G/100ML
1 INJECTION INTRAVENOUS ONCE
Status: COMPLETED | OUTPATIENT
Start: 2023-08-14 | End: 2023-08-14

## 2023-08-14 RX ORDER — SODIUM CHLORIDE, SODIUM LACTATE, POTASSIUM CHLORIDE, CALCIUM CHLORIDE 600; 310; 30; 20 MG/100ML; MG/100ML; MG/100ML; MG/100ML
INJECTION, SOLUTION INTRAVENOUS CONTINUOUS
Status: DISCONTINUED | OUTPATIENT
Start: 2023-08-14 | End: 2023-08-14

## 2023-08-14 RX ORDER — CEFAZOLIN SODIUM 1 G/3ML
2 INJECTION, POWDER, FOR SOLUTION INTRAMUSCULAR; INTRAVENOUS ONCE
Status: COMPLETED | OUTPATIENT
Start: 2023-08-14 | End: 2023-08-14

## 2023-08-14 RX ORDER — FENTANYL CITRATE 50 UG/ML
INJECTION, SOLUTION INTRAMUSCULAR; INTRAVENOUS
Status: DISCONTINUED | OUTPATIENT
Start: 2023-08-14 | End: 2023-08-14

## 2023-08-14 RX ORDER — MORPHINE SULFATE 2 MG/ML
2 INJECTION, SOLUTION INTRAMUSCULAR; INTRAVENOUS
Status: DISCONTINUED | OUTPATIENT
Start: 2023-08-14 | End: 2023-08-15 | Stop reason: HOSPADM

## 2023-08-14 RX ADMIN — FENTANYL CITRATE 50 MCG: 50 INJECTION INTRAMUSCULAR; INTRAVENOUS at 09:08

## 2023-08-14 RX ADMIN — KETOROLAC TROMETHAMINE 30 MG: 30 INJECTION, SOLUTION INTRAMUSCULAR at 09:08

## 2023-08-14 RX ADMIN — DEXAMETHASONE SODIUM PHOSPHATE 8 MG: 4 INJECTION, SOLUTION INTRA-ARTICULAR; INTRALESIONAL; INTRAMUSCULAR; INTRAVENOUS; SOFT TISSUE at 09:08

## 2023-08-14 RX ADMIN — ONDANSETRON 4 MG: 2 INJECTION INTRAMUSCULAR; INTRAVENOUS at 10:08

## 2023-08-14 RX ADMIN — SODIUM CHLORIDE, POTASSIUM CHLORIDE, SODIUM LACTATE AND CALCIUM CHLORIDE: 600; 310; 30; 20 INJECTION, SOLUTION INTRAVENOUS at 04:08

## 2023-08-14 RX ADMIN — EPHEDRINE SULFATE 25 MG: 50 INJECTION INTRAVENOUS at 09:08

## 2023-08-14 RX ADMIN — PIPERACILLIN AND TAZOBACTAM 4.5 G: 4; .5 INJECTION, POWDER, LYOPHILIZED, FOR SOLUTION INTRAVENOUS; PARENTERAL at 02:08

## 2023-08-14 RX ADMIN — OXYCODONE HYDROCHLORIDE 10 MG: 5 TABLET ORAL at 09:08

## 2023-08-14 RX ADMIN — ONDANSETRON 4 MG: 2 INJECTION INTRAMUSCULAR; INTRAVENOUS at 11:08

## 2023-08-14 RX ADMIN — MORPHINE SULFATE 2 MG: 2 INJECTION, SOLUTION INTRAMUSCULAR; INTRAVENOUS at 02:08

## 2023-08-14 RX ADMIN — PROPOFOL 200 MG: 10 INJECTION, EMULSION INTRAVENOUS at 09:08

## 2023-08-14 RX ADMIN — SUCCINYLCHOLINE CHLORIDE 140 MG: 20 INJECTION, SOLUTION INTRAMUSCULAR; INTRAVENOUS at 09:08

## 2023-08-14 RX ADMIN — ROCURONIUM BROMIDE 10 MG: 10 INJECTION INTRAVENOUS at 09:08

## 2023-08-14 RX ADMIN — SODIUM CHLORIDE, POTASSIUM CHLORIDE, SODIUM LACTATE AND CALCIUM CHLORIDE: 600; 310; 30; 20 INJECTION, SOLUTION INTRAVENOUS at 09:08

## 2023-08-14 RX ADMIN — MIDAZOLAM HYDROCHLORIDE 2 MG: 1 INJECTION, SOLUTION INTRAMUSCULAR; INTRAVENOUS at 08:08

## 2023-08-14 RX ADMIN — SUGAMMADEX 200 MG: 100 INJECTION, SOLUTION INTRAVENOUS at 10:08

## 2023-08-14 RX ADMIN — PANTOPRAZOLE SODIUM 40 MG: 40 TABLET, DELAYED RELEASE ORAL at 08:08

## 2023-08-14 RX ADMIN — POTASSIUM CHLORIDE 40 MEQ: 1500 TABLET, EXTENDED RELEASE ORAL at 06:08

## 2023-08-14 RX ADMIN — HEPARIN SODIUM 5000 UNITS: 5000 INJECTION, SOLUTION INTRAVENOUS; SUBCUTANEOUS at 08:08

## 2023-08-14 RX ADMIN — SODIUM CHLORIDE, POTASSIUM CHLORIDE, SODIUM LACTATE AND CALCIUM CHLORIDE: 600; 310; 30; 20 INJECTION, SOLUTION INTRAVENOUS at 08:08

## 2023-08-14 RX ADMIN — LIDOCAINE HYDROCHLORIDE 80 MG: 20 INJECTION INTRAVENOUS at 09:08

## 2023-08-14 RX ADMIN — METHOCARBAMOL 500 MG: 500 TABLET ORAL at 08:08

## 2023-08-14 RX ADMIN — ONDANSETRON 4 MG: 2 INJECTION INTRAMUSCULAR; INTRAVENOUS at 05:08

## 2023-08-14 RX ADMIN — HYDROMORPHONE HYDROCHLORIDE 0.5 MG: 1 INJECTION, SOLUTION INTRAMUSCULAR; INTRAVENOUS; SUBCUTANEOUS at 10:08

## 2023-08-14 RX ADMIN — OXYCODONE HYDROCHLORIDE 5 MG: 5 TABLET ORAL at 03:08

## 2023-08-14 RX ADMIN — CEFAZOLIN 2 G: 330 INJECTION, POWDER, FOR SOLUTION INTRAMUSCULAR; INTRAVENOUS at 09:08

## 2023-08-14 RX ADMIN — MAGNESIUM SULFATE HEPTAHYDRATE 1 G: 10 INJECTION, SOLUTION INTRAVENOUS at 06:08

## 2023-08-14 NOTE — OP NOTE
[facility] Surg Telemetry  Surgery Department  Operative Note    SUMMARY     Date of Procedure: 8/14/2023     Procedure: Procedure(s) (LRB):  APPENDECTOMY, LAPAROSCOPIC (N/A)     Surgeon(s) and Role:     * Ce Hidalgo MD - Primary     * Crystal Barragan MD - Resident - Assisting  Ania Robertson    Pre-Operative Diagnosis: Pre-op Diagnosis Codes:     * Appendicitis, unspecified appendicitis type [K37]    Post-Operative Diagnosis: same    Anesthesia: General    Operative Findings (including complications, if any): inflamed appendix without evidence of perforation or abscess    Description of Technical Procedures:   Pt was brought to the operating room and placed in the supine position on the operating table. General endotracheal anesthesia was administered. Pt's abdomen was prepped with chloraprep and draped in sterile fashion. A time out was called to confirm the correct patient, procedure, and site.      A veress needle was used to enter the abdomen at Ann's point and used to insufflate the abdomen to 15mmHg. The abdomen was entered using a 5mm optiview trocar through a supraumbilical incision. No injuries were noted to the bowel or the mesentery. An additional 5 mm suprapubic trochar was placed under direct visualization and a 12 mm trochar was placed in the LLQ. The mesentery and bowel were re-examined and no further injury was identified. The appendix was easily identified. It appeared inflamed but without evidence of gross perforation or abscess. The appendix was freed from any surrounding structures using blunt dissection as well as hook electrocautery to take down lateral peritoneal attachments. A maryland was used to make a window at the base of the appendix. The base of the appendix was then taken down using a blue load laparoscopic stapler. The appendiceal artery / mesentery was then taken down with a white load laparoscopic stapler. The staple lines were inspected and appeared  intact. There was a small amount of bleeding from the mesentery staple line. This was resolved with electrocautery and clip placement. The appendix was removed using an endocatch bag via the LLQ port. This was sent off to pathology. No purulent fluid was found in the pelvis.       The fascia at the 12mm trocar sites was closed using a 0 Vicryl suture on a PMI. The abdomen was desufflated. 30cc 0.25% marcaine was injected at the port sites and the skin was closed with 4-0 Monocryl suture. The patient tolerated the procedure well and was taken to PACU in a stable condition.      Dr. Davenport present for the entirety of the procedure    Estimated Blood Loss (EBL): 10cc           Implants: * No implants in log *    Specimens:   Specimen (24h ago, onward)       Start     Ordered    08/14/23 1018  Specimen to Pathology  RELEASE UPON ORDERING        References:    Click here for ordering Quick Tip   Question:  Release to patient  Answer:  Immediate    08/14/23 1018                            Condition: Good    Disposition: PACU - hemodynamically stable.    Attestation: I was present and scrubbed for the entire procedure.

## 2023-08-14 NOTE — ANESTHESIA PREPROCEDURE EVALUATION
08/14/2023  Cassandra Coleman is a 47 y.o., female with PMHx of obesity,hypothyroidism presents for laparoscopic appendectomy.    NO BETA BLOCKER USE    Active Ambulatory Problems     Diagnosis Date Noted    No Active Ambulatory Problems     Resolved Ambulatory Problems     Diagnosis Date Noted    No Resolved Ambulatory Problems     No Additional Past Medical History     Antibiotics:  Zosyn 4.5 g IV q 8 (last dose 8/11/23 @ 0247)    DVT Prophylaxis:  Lovenox 40 mg subq qd (last dose 8/13/23 @ 1841)      Pre-op Assessment    I have reviewed the NPO Status.      Review of Systems  Anesthesia Hx:  No problems with previous Anesthesia    Social:  Non-Smoker    Cardiovascular:  Cardiovascular Normal     Pulmonary:  Pulmonary Normal    Renal/:  Renal/ Normal     Hepatic/GI:  Hepatic/GI Normal    Neurological:  Neurology Normal    Endocrine:   Hypothyroidism  Obesity / BMI > 30    Vitals:    08/14/23 0329 08/14/23 0700 08/14/23 0745 08/14/23 0826   BP: 102/68  96/62 122/70   BP Location:       Patient Position:       Pulse: 62  73 72   Resp: 18  18 20   Temp: 36.5 °C (97.7 °F)  36.7 °C (98.1 °F) 36.3 °C (97.3 °F)   TempSrc: Oral  Oral Temporal   SpO2: 99% 98% 99% 100%   Weight:       Height:             Physical Exam  General: Alert, Cooperative and Well nourished    Airway:  Mallampati: II   Mouth Opening: Normal  TM Distance: Normal  Tongue: Normal  Neck ROM: Normal ROM    Dental:  Intact    Chest/Lungs:  Clear to auscultation, Normal Respiratory Rate    Heart:  Rate: Normal  Rhythm: Regular Rhythm  Sounds: Normal       Latest Reference Range & Units 08/13/23 14:30   Preg Test, Ur Negative  Negative     Lab Results   Component Value Date    WBC 8.51 08/14/2023    HGB 13.1 08/14/2023    HCT 37.0 08/14/2023    MCV 86.2 08/14/2023     08/14/2023       CMP  Sodium Level   Date Value Ref Range  Status   08/14/2023 143 136 - 145 mmol/L Final     Potassium Level   Date Value Ref Range Status   08/14/2023 3.4 (L) 3.5 - 5.1 mmol/L Final     Carbon Dioxide   Date Value Ref Range Status   08/14/2023 23 22 - 29 mmol/L Final     Blood Urea Nitrogen   Date Value Ref Range Status   08/14/2023 10.3 7.0 - 18.7 mg/dL Final     Creatinine   Date Value Ref Range Status   08/14/2023 0.75 0.55 - 1.02 mg/dL Final     Calcium Level Total   Date Value Ref Range Status   08/14/2023 8.6 8.4 - 10.2 mg/dL Final     Albumin Level   Date Value Ref Range Status   08/13/2023 3.8 3.5 - 5.0 g/dL Final     Bilirubin Total   Date Value Ref Range Status   08/13/2023 0.7 <=1.5 mg/dL Final     Alkaline Phosphatase   Date Value Ref Range Status   08/13/2023 103 40 - 150 unit/L Final     Aspartate Aminotransferase   Date Value Ref Range Status   08/13/2023 31 5 - 34 unit/L Final     Alanine Aminotransferase   Date Value Ref Range Status   08/13/2023 38 0 - 55 unit/L Final     eGFR   Date Value Ref Range Status   08/14/2023 >60 mls/min/1.73/m2 Final         Anesthesia Plan  Type of Anesthesia, risks & benefits discussed:    Anesthesia Type: Gen ETT  Intra-op Monitoring Plan: Standard ASA Monitors  Post Op Pain Control Plan: IV/PO Opioids PRN  Induction:  IV  Airway Plan: Direct  Informed Consent: Informed consent signed with the Patient and all parties understand the risks and agree with anesthesia plan.  All questions answered.   ASA Score: 2  Day of Surgery Review of History & Physical: H&P Update referred to the surgeon/provider.    Ready For Surgery From Anesthesia Perspective.     .

## 2023-08-14 NOTE — MEDICAL/APP STUDENT
[facility] General Surgery Progress Note    Cassandra Coleman   [id number]   Hospital Day: 2   * No surgery date entered *     Subjective  ***    Objective  Temp:  [97.7 °F (36.5 °C)-98.2 °F (36.8 °C)] 97.7 °F (36.5 °C)  Pulse:  [59-80] 62  Resp:  [16-18] 18  SpO2:  [98 %-100 %] 99 %  BP: ()/(45-71) 102/68    I/O this shift:  In: 39 [IV Piggyback:39]  Out: -   I/O last 3 completed shifts:  In: 248.4 [IV Piggyback:248.4]  Out: -     Physical Exam  Gen: NAD, AAOx3  CV: extremities wwp, regular rate, palpable radials  Pulm: nonlabored, no increased wob, equal chest rise b/l   Abd: s/nt/nd ***  Wounds:***    Labs:      Recent Labs   Lab 08/13/23  1408 08/14/23  0423   WBC 14.61* 8.51   HGB 15.7 13.1   HCT 43.6 37.0    162    143   CHLORIDE 109* 112*   CO2 18* 23   BUN 12.3 10.3   CREATININE 0.72 0.75   GLUCOSE 119* 105*   CALCIUM 9.3 8.6   MG  --  1.80   PHOS  --  3.9   ALKPHOS 103  --        Imaging:   Imaging Results              CT Abdomen Pelvis With Contrast (Final result)  Result time 08/13/23 17:02:17      Final result by Edward Higginbotham MD (08/13/23 17:02:17)                   Impression:      Findings concerning for acute appendicitis.  No evidence of abscess or perforation.    Findings reported to Dr. AWAD in the  ER prior to interpretation.      Electronically signed by: Edward Higginbotham  Date:    08/13/2023  Time:    17:02               Narrative:    EXAMINATION:  CT ABDOMEN PELVIS WITH CONTRAST    CLINICAL HISTORY:  RLQ abdominal pain (Age >= 14y);    TECHNIQUE:  Multidetector IV contrast enhanced axial CT images of the abdomen and pelvis were obtained with coronal and sagittal reconstructions.    Automatic exposure control was utilized to reduce the patient's radiation dose.    DLP= 476    COMPARISON:  No prior imaging available for comparison.    FINDINGS:  01. HEPATOBILIARY: No focal hepatic lesion is identified, The gallbladder is normal.    02. SPLEEN: Normal    03. PANCREAS:  No focal masses or ductal dilatation.    04. ADRENALS: No adrenal nodules.    05. KIDNEYS: The right kidney demonstrates no stone, hydronephrosis, or hydroureter. No focal mass identified. The left kidney demonstrates no stone, hydronephrosis, or hydroureter. No focal mass identified.    06. LYMPHADENOPATHY/RETROPERITONEUM: There is no retroperitoneal lymphadenopathy. The abdominal aorta is normal in course and caliber.    07. BOWEL: The appendix is enlarged and hyperemic.    08. PELVIC VISCERA: Normal. No pelvic mass.    09. PELVIC LYMPH NODES: No lymphadenopathy.    10. PERITONEUM/ABDOMINAL WALL: No ascites or implant.    11. SKELETAL: No aggressive appearing lytic/blastic lesion. No acute fractures, subluxations or dislocations.    12. LUNG BASES: The visualized lungs are unremarkable.                                       A/P:    Cassandra Coleman is a 47 y.o. female with history of gastritis admitted for acute appendicitis. Remains afebrile and hemodynamically stable. Laparoscopic appendectomy today.       Tasha Linares, MS4  [facility]

## 2023-08-14 NOTE — ANESTHESIA POSTPROCEDURE EVALUATION
Anesthesia Post Evaluation    Patient: Cassandra Coleman    Procedure(s) Performed: Procedure(s) (LRB):  APPENDECTOMY, LAPAROSCOPIC (N/A)    Final Anesthesia Type: general      Patient location during evaluation: med/surg floor  Post-procedure vital signs: reviewed and stable  Airway patency: patent      Anesthetic complications: no      Cardiovascular status: hemodynamically stable  Respiratory status: spontaneous ventilation  Follow-up not needed.          Vitals Value Taken Time   /80 08/14/23 1212   Temp 36.1 °C (96.9 °F) 08/14/23 1045   Pulse 87 08/14/23 1212   Resp 21 08/14/23 1148   SpO2 97 % 08/14/23 1212         Event Time   Out of Recovery 08/14/2023 11:48:00         Pain/Inés Score: Pain Rating Prior to Med Admin: 0 (8/14/2023  6:00 AM)  Pain Rating Post Med Admin: 0 (8/14/2023  4:29 AM)  Inés Score: 9 (8/14/2023 11:48 AM)

## 2023-08-14 NOTE — H&P
"   Acute Care Surgery   History and Physical Note    Patient Name: Cassandra Coleman  YOB: 1975  Date: 08/14/2023 6:00 PM  Date of Admission: 8/13/2023  HD#0  POD#* No surgery found *    PRESENTING HISTORY   Chief Complaint/Reason for Admission: abdominal pain    History of Present Illness: 47 y.o. female with history of gastritis presents to the ED with epigastric abdominal pain. Pt reports she started having intermittent epigastric discomfort 2 weeks ago. This is not new for her and she related it to her gastritis. However, this morning, she began having acute pain. It was associated with nausea and NBNB emesis. Pain is now also in her RLQ. She took her "gastritis medications" at home but it did not alleviate her pain. Denies fever, chills, HA, dizziness. Denies diarrhea, constipation, or urination changes.    Pt had workup for epigastric pain ~2-3 years ago with endoscopy and colonoscopy. Workup at that time showed gastric ulcers and she was prescribed medications. Colonoscopy at that time showed no abnormalities. Pt denies history of IBD.    Review of Systems:  12 point ROS negative except as stated in HPI    PAST HISTORY:   Past medical history:  History reviewed. No pertinent past medical history.    Past surgical history:  Cholecystectomy (~11 years ago)    Family history:  History reviewed. No pertinent family history.    Social history:  Social History     Socioeconomic History    Marital status:      Social History     Tobacco Use   Smoking Status Not on file   Smokeless Tobacco Not on file      Social History     Substance and Sexual Activity   Alcohol Use None        MEDICATIONS & ALLERGIES:     No current facility-administered medications on file prior to encounter.     No current outpatient medications on file prior to encounter.       Allergies: Review of patient's allergies indicates:  No Known Allergies    OBJECTIVE:   Vital Signs:  VITAL SIGNS: 24 HR MIN & MAX " "LAST   Temp  Min: 97.7 °F (36.5 °C)  Max: 98.2 °F (36.8 °C)  97.7 °F (36.5 °C)   BP  Min: 89/45  Max: 127/65  102/68    Pulse  Min: 59  Max: 80  62    Resp  Min: 16  Max: 18  18    SpO2  Min: 98 %  Max: 100 %  99 %      HT: 5' 4.96" (165 cm)  WT: 88 kg (194 lb 0.1 oz)  BMI: 32.3       Physical Exam:  General: Well developed, well nourished, no acute distress  HEENT: Normocephalic, atraumatic, PERRL  CV: RR  Resp: NWOB  GI:  Abdomen soft, non-distended, no guarding, no rebound tenderness. RLQ and epigastric TTP. Negative Rovsing sign.   MSK: No muscle atrophy, cyanosis, peripheral edema, moving all extremities spontaneously  Skin/wounds:  No rashes, ulcers, erythema  Neuro: alert and oriented to person, place, and time    Labs:  Troponin:  No results for input(s): "TROPONINI" in the last 72 hours.  CBC:  Recent Labs     08/13/23  1408   WBC 14.61*   RBC 5.27   HGB 15.7   HCT 43.6      MCV 82.7   MCH 29.8   MCHC 36.0       CMP:  Recent Labs     08/13/23  1408   CALCIUM 9.3   ALBUMIN 3.8      K 3.5   CO2 18*   BUN 12.3   CREATININE 0.72   ALKPHOS 103   ALT 38   AST 31   BILITOT 0.7       Lactic Acid:  No results for input(s): "LACTATE" in the last 72 hours.  ETOH:  No results for input(s): "ETHANOL" in the last 72 hours.   Urine Drug Screen:  Recent Labs     08/13/23  1410   COCAINE Negative   OPIATE Negative   FENTANYL Negative   MDMA Negative        ABG:  No results for input(s): "PH", "PCO2", "PO2", "HCO3", "BE", "POCSATURATED" in the last 72 hours.    Diagnostic Results:  CT Abdomen Pelvis With Contrast   Final Result      Findings concerning for acute appendicitis.  No evidence of abscess or perforation.      Findings reported to Dr. AWAD in the  ER prior to interpretation.         Electronically signed by: Edward Higginbotham   Date:    08/13/2023   Time:    17:02          ASSESSMENT & PLAN:    47 y.o. female with hx of gastritis admitted for acute appendicitis. Physical exam with +Mcburney's point. CT " findings concerning for acute appendicitis. No evidence of abscess or perforation. Currently afebrile, VSS, HDS. Plan for appendectomy in the morning.     - zosyn  - reg diet, NPO after midnight w/ mIVF  - restart home meds  - plan for laparoscopic appendectomy in AM    Tasha Linares, MS4  [facility]      Pt seen and examined, changes made above as necessary  Ania Robertson MD

## 2023-08-14 NOTE — PROGRESS NOTES
Surgery Progress Note    S:  NAEO, AF HDS  Reports pain mildly improved  No nausea or emesis  NPO since midnight  Denies CP, SOB, fevers    O:  Temp:  [97.7 °F (36.5 °C)-98.2 °F (36.8 °C)] 97.7 °F (36.5 °C)  Pulse:  [59-80] 62  Resp:  [16-18] 18  SpO2:  [98 %-100 %] 99 %  BP: ()/(45-71) 102/68    Physical Exam:  Gen: NAD, AAOx3  HEENT: EOMI, NCAT  CV: RR  Resp: no shortness of breath, normal WOB  Abd: soft, non-distended, TTP RLQ, no guarding, no rebound tenderness  Ext: no edema    Labs:  WBC 8 (14)  K 3.4  BUN/Cr 10.3/0.75  Mg 1.8  P 3.9    A/P:  47 y.o. female with hx of gastritis admitted for acute appendicitis. Physical exam with +Mcburney's point. CT findings concerning for acute appendicitis. No evidence of abscess or perforation. Has remained afebrile, VSS, HDS.     - plan for OR today, laparoscopic appendectomy  - continue zosyn  - NPO, mIVF  - electrolyte replacement PRN    Ania Robertson MD  [employer] General Surgery

## 2023-08-14 NOTE — ANESTHESIA PROCEDURE NOTES
Intubation    Date/Time: 8/14/2023 9:09 AM    Performed by: Chuck Espinosa CRNA  Authorized by: Tyra Whitt MD    Intubation:     Induction:  Intravenous    Intubated:  Postinduction    Mask Ventilation:  Not attempted    Attempts:  1    Attempted By:  CRNA    Method of Intubation:  Direct    Blade:  Thelma 4    Laryngeal View Grade: Grade I - full view of cords      Difficult Airway Encountered?: No      Complications:  None    Airway Device:  Oral endotracheal tube    Airway Device Size:  7.5    Style/Cuff Inflation:  Cuffed (inflated to minimal occlusive pressure)    Inflation Amount (mL):  7    Tube secured:  22    Secured at:  The lips    Placement Verified By:  Capnometry    Complicating Factors:  None    Findings Post-Intubation:  BS equal bilateral and atraumatic/condition of teeth unchanged

## 2023-08-14 NOTE — TRANSFER OF CARE
"Anesthesia Transfer of Care Note    Patient: Cassandra Coleman    Procedure(s) Performed: Procedure(s) (LRB):  APPENDECTOMY, LAPAROSCOPIC (N/A)    Patient location: PACU    Anesthesia Type: general    Transport from OR: Transported from OR on room air with adequate spontaneous ventilation    Post pain: adequate analgesia    Post assessment: no apparent anesthetic complications    Post vital signs: stable    Level of consciousness: sedated    Nausea/Vomiting: no nausea/vomiting    Complications: none    Transfer of care protocol was followed      Last vitals:   Visit Vitals  /70   Pulse 72   Temp 36.3 °C (97.3 °F) (Temporal)   Resp 20   Ht 5' 4.96" (1.65 m)   Wt 88 kg (194 lb 0.1 oz)   LMP  (LMP Unknown)   SpO2 100%   Breastfeeding No   BMI 32.32 kg/m²     "

## 2023-08-15 VITALS
DIASTOLIC BLOOD PRESSURE: 61 MMHG | TEMPERATURE: 98 F | WEIGHT: 194 LBS | HEART RATE: 67 BPM | HEIGHT: 65 IN | BODY MASS INDEX: 32.32 KG/M2 | OXYGEN SATURATION: 98 % | RESPIRATION RATE: 16 BRPM | SYSTOLIC BLOOD PRESSURE: 102 MMHG

## 2023-08-15 PROBLEM — K37 APPENDICITIS: Status: ACTIVE | Noted: 2023-08-15

## 2023-08-15 PROBLEM — K37 APPENDICITIS: Status: RESOLVED | Noted: 2023-08-15 | Resolved: 2023-08-15

## 2023-08-15 LAB
ANION GAP SERPL CALC-SCNC: 10 MEQ/L
BUN SERPL-MCNC: 10.8 MG/DL (ref 7–18.7)
CALCIUM SERPL-MCNC: 8.9 MG/DL (ref 8.4–10.2)
CHLORIDE SERPL-SCNC: 110 MMOL/L (ref 98–107)
CO2 SERPL-SCNC: 23 MMOL/L (ref 22–29)
CREAT SERPL-MCNC: 0.79 MG/DL (ref 0.55–1.02)
CREAT/UREA NIT SERPL: 14
ERYTHROCYTE [DISTWIDTH] IN BLOOD BY AUTOMATED COUNT: 13.1 % (ref 11.5–17)
ESTROGEN SERPL-MCNC: NORMAL PG/ML
GFR SERPLBLD CREATININE-BSD FMLA CKD-EPI: >60 MLS/MIN/1.73/M2
GLUCOSE SERPL-MCNC: 105 MG/DL (ref 74–100)
HCT VFR BLD AUTO: 36.8 % (ref 37–47)
HGB BLD-MCNC: 12.9 G/DL (ref 12–16)
INSULIN SERPL-ACNC: NORMAL U[IU]/ML
LAB AP CLINICAL INFORMATION: NORMAL
LAB AP GROSS DESCRIPTION: NORMAL
LAB AP REPORT FOOTNOTES: NORMAL
MCH RBC QN AUTO: 30.4 PG (ref 27–31)
MCHC RBC AUTO-ENTMCNC: 35.1 G/DL (ref 33–36)
MCV RBC AUTO: 86.6 FL (ref 80–94)
NRBC BLD AUTO-RTO: 0 %
PLATELET # BLD AUTO: 165 X10(3)/MCL (ref 130–400)
PMV BLD AUTO: 10.2 FL (ref 7.4–10.4)
POTASSIUM SERPL-SCNC: 3.8 MMOL/L (ref 3.5–5.1)
RBC # BLD AUTO: 4.25 X10(6)/MCL (ref 4.2–5.4)
SODIUM SERPL-SCNC: 143 MMOL/L (ref 136–145)
T3RU NFR SERPL: NORMAL %
WBC # SPEC AUTO: 12.28 X10(3)/MCL (ref 4.5–11.5)

## 2023-08-15 PROCEDURE — 25000003 PHARM REV CODE 250: Performed by: STUDENT IN AN ORGANIZED HEALTH CARE EDUCATION/TRAINING PROGRAM

## 2023-08-15 PROCEDURE — 80048 BASIC METABOLIC PNL TOTAL CA: CPT | Performed by: STUDENT IN AN ORGANIZED HEALTH CARE EDUCATION/TRAINING PROGRAM

## 2023-08-15 PROCEDURE — 94799 UNLISTED PULMONARY SVC/PX: CPT

## 2023-08-15 PROCEDURE — 85027 COMPLETE CBC AUTOMATED: CPT | Performed by: STUDENT IN AN ORGANIZED HEALTH CARE EDUCATION/TRAINING PROGRAM

## 2023-08-15 PROCEDURE — 94761 N-INVAS EAR/PLS OXIMETRY MLT: CPT

## 2023-08-15 RX ORDER — METHOCARBAMOL 500 MG/1
500 TABLET, FILM COATED ORAL 3 TIMES DAILY
Qty: 15 TABLET | Refills: 0 | Status: SHIPPED | OUTPATIENT
Start: 2023-08-15 | End: 2023-08-20

## 2023-08-15 RX ORDER — OXYCODONE AND ACETAMINOPHEN 5; 325 MG/1; MG/1
1 TABLET ORAL EVERY 6 HOURS PRN
Qty: 5 TABLET | Refills: 0 | Status: SHIPPED | OUTPATIENT
Start: 2023-08-15

## 2023-08-15 RX ADMIN — ACETAMINOPHEN 650 MG: 325 TABLET, FILM COATED ORAL at 09:08

## 2023-08-15 RX ADMIN — PANTOPRAZOLE SODIUM 40 MG: 40 TABLET, DELAYED RELEASE ORAL at 09:08

## 2023-08-15 RX ADMIN — ACETAMINOPHEN 650 MG: 325 TABLET, FILM COATED ORAL at 02:08

## 2023-08-15 RX ADMIN — ACETAMINOPHEN 650 MG: 325 TABLET, FILM COATED ORAL at 05:08

## 2023-08-15 RX ADMIN — OXYCODONE HYDROCHLORIDE 10 MG: 5 TABLET ORAL at 02:08

## 2023-08-15 RX ADMIN — METHOCARBAMOL 500 MG: 500 TABLET ORAL at 09:08

## 2023-08-15 RX ADMIN — ACETAMINOPHEN 650 MG: 325 TABLET, FILM COATED ORAL at 01:08

## 2023-08-15 RX ADMIN — METHOCARBAMOL 500 MG: 500 TABLET ORAL at 01:08

## 2023-08-15 NOTE — NURSING
Patient was discharged to home or self care. Patient IV was removed and patient education was provided. Patient was transported downstairs via wheelchair.

## 2023-08-15 NOTE — PROGRESS NOTES
Surgery Progress Note    S:  POD1 s/p laparoscopic appendectomy    AF HDS  Reports pain improved, minimal  Has not eaten much post op due to mild nausea, no emesis  Denies CP, SOB, fevers  Ambulating to bathroom    O:  Temp:  [96.9 °F (36.1 °C)-98.3 °F (36.8 °C)] 98.3 °F (36.8 °C)  Pulse:  [] 78  Resp:  [10-21] 16  SpO2:  [95 %-100 %] 97 %  BP: ()/(62-93) 117/71    Physical Exam:  Gen: NAD, AAOx3  HEENT: EOMI, NCAT  CV: RR  Resp: no shortness of breath, normal WOB  Abd: soft, non-distended, appropriately tender to palpation, incisions well approximated, mild bruising  Ext: no edema    Labs:  Pending, post op H/H stable    A/P:  47 y.o. female with hx of gastritis admitted for acute appendicitis. Physical exam with +Mcburney's point. CT findings concerning for acute appendicitis. No evidence of abscess or perforation. Has remained afebrile, VSS, HDS. S/p laparoscopic appendectomy 8/14    - likely discharge home today    Ania Robertson MD  [facility] General Surgery

## 2023-08-18 NOTE — DISCHARGE SUMMARY
[facility] Telemetry  General Surgery  Discharge Summary      Patient Name: Cassandra Coleman  MRN: [id number]  Admission Date: 8/13/2023  Hospital Length of Stay: 0 days  Discharge Date and Time: 8/15/2023  2:51 PM  Attending Physician: German Hester MD  Discharging Provider: Ania Robertson MD  Primary Care Provider: No, Primary Doctor     Procedure(s) (LRB):  APPENDECTOMY, LAPAROSCOPIC (N/A)     Hospital Course: 47 y.o. female with hx of gastritis admitted 8/13 for acute appendicitis. Physical exam with +Mcburney's point tenderness. CT findings concerning for acute appendicitis. No evidence of abscess or perforation. Pt underwent laparoscopic appendectomy 8/14. She tolerated the procedure well and has remained afebrile, VSS, HDS. Pt is now tolerating a regular diet without nausea or vomiting, ambulating without issue, and passing flatus. Her pain is well controlled with PO pain meds. She is stable for discharge to home with clinic follow up in 2 weeks.    Consults: none    Significant Diagnostic Studies: Radiology: CT scan: CT ABDOMEN PELVIS WITH CONTRAST:   Results for orders placed or performed during the hospital encounter of 08/13/23   CT Abdomen Pelvis With Contrast    Narrative    EXAMINATION:  CT ABDOMEN PELVIS WITH CONTRAST    CLINICAL HISTORY:  RLQ abdominal pain (Age >= 14y);    TECHNIQUE:  Multidetector IV contrast enhanced axial CT images of the abdomen and pelvis were obtained with coronal and sagittal reconstructions.    Automatic exposure control was utilized to reduce the patient's radiation dose.    DLP= 476    COMPARISON:  No prior imaging available for comparison.    FINDINGS:  01. HEPATOBILIARY: No focal hepatic lesion is identified, The gallbladder is normal.    02. SPLEEN: Normal    03. PANCREAS: No focal masses or ductal dilatation.    04. ADRENALS: No adrenal nodules.    05. KIDNEYS: The right kidney demonstrates no stone, hydronephrosis, or  hydroureter. No focal mass identified. The left kidney demonstrates no stone, hydronephrosis, or hydroureter. No focal mass identified.    06. LYMPHADENOPATHY/RETROPERITONEUM: There is no retroperitoneal lymphadenopathy. The abdominal aorta is normal in course and caliber.    07. BOWEL: The appendix is enlarged and hyperemic.    08. PELVIC VISCERA: Normal. No pelvic mass.    09. PELVIC LYMPH NODES: No lymphadenopathy.    10. PERITONEUM/ABDOMINAL WALL: No ascites or implant.    11. SKELETAL: No aggressive appearing lytic/blastic lesion. No acute fractures, subluxations or dislocations.    12. LUNG BASES: The visualized lungs are unremarkable.      Impression    Findings concerning for acute appendicitis.  No evidence of abscess or perforation.    Findings reported to Dr. AWAD in the  ER prior to interpretation.      Electronically signed by: Edward Higginbotham  Date:    08/13/2023  Time:    17:02       Pending Diagnostic Studies:       Procedure Component Value Units Date/Time    EXTRA TUBES [[id number]] Collected: 08/15/23 0720    Order Status: Sent Lab Status: In process Updated: 08/15/23 0720    Specimen: Blood, Venous     Narrative:      The following orders were created for panel order EXTRA TUBES.  Procedure                               Abnormality         Status                     ---------                               -----------         ------                     Light Blue Top Hold[[id number]]                              In process                 Gold Top Hold[[id number]]                                    In process                   Please view results for these tests on the individual orders.    EXTRA TUBES [[id number]] Collected: 08/13/23 1419    Order Status: Sent Lab Status: In process Updated: 08/13/23 1419    Specimen: Blood, Venous     Narrative:      The following orders were created for panel order EXTRA TUBES.  Procedure                               Abnormality         Status                      ---------                               -----------         ------                     Light Blue Top Hold[[id number]]                              In process                 Gold Top Hold[[id number]]                                    In process                   Please view results for these tests on the individual orders.          Final Active Diagnoses:      Problems Resolved During this Admission:    Diagnosis Date Noted Date Resolved POA    Appendicitis [K37] 08/15/2023 08/15/2023 Yes      Discharged Condition: good    Disposition: Home or Self Care    Follow Up: 2 weeks    Patient Instructions:      Diet Adult Regular     Notify your health care provider if you experience any of the following:  temperature >100.4     Notify your health care provider if you experience any of the following:  persistent nausea and vomiting or diarrhea     Notify your health care provider if you experience any of the following:  severe uncontrolled pain     Notify your health care provider if you experience any of the following:  redness, tenderness, or signs of infection (pain, swelling, redness, odor or green/yellow discharge around incision site)     No dressing needed     Activity as tolerated     Medications:  Reconciled Home Medications:      Medication List        START taking these medications      methocarbamoL 500 MG Tab  Commonly known as: ROBAXIN  Take 1 tablet (500 mg total) by mouth 3 (three) times daily. for 5 days     oxyCODONE-acetaminophen 5-325 mg per tablet  Commonly known as: PERCOCET  Take 1 tablet by mouth every 6 (six) hours as needed for Pain.              Ania Robertson MD  General Surgery  [facility] Telemetry

## (undated) DEVICE — STAPLER INT LINEAR ARTC 3.5-45

## (undated) DEVICE — TROCAR KII FIOS 5MM X 100MM

## (undated) DEVICE — Device

## (undated) DEVICE — APPLICATOR CHLORAPREP ORN 26ML

## (undated) DEVICE — ELECTRODE MEGADYNE L-WIRE 33CM

## (undated) DEVICE — TROCAR LAPSCP XCEL 12MM 10CM

## (undated) DEVICE — CART STAPLE FLEX ETX 3.5MM BLU

## (undated) DEVICE — SOL IRRI STRL WATER 1000ML

## (undated) DEVICE — GLOVE PROTEXIS HYDROGEL SZ6

## (undated) DEVICE — GLOVE PROTEXIS LTX MICRO  7

## (undated) DEVICE — ADHESIVE DERMABOND ADVANCED

## (undated) DEVICE — KIT LAPAROSCOPY UNIVERSITY

## (undated) DEVICE — SLEEVE KII ADV FIX 5X100MM

## (undated) DEVICE — GLOVE SIGNATURE MICRO LTX 6.5

## (undated) DEVICE — NDL HYPO REG 25G X 1 1/2

## (undated) DEVICE — BAG TISS RETRV MONARCH 10MM

## (undated) DEVICE — CONTAINER SPECIMEN OR STER 4OZ

## (undated) DEVICE — SUT MCRYL PLUS 4-0 PS2 27IN

## (undated) DEVICE — SYR 10CC LUER LOCK

## (undated) DEVICE — CART STAPLE RELD 45MM WHT

## (undated) DEVICE — GLOVE PROTEXIS NEOPRN SZ6.5

## (undated) DEVICE — HANDLE DEVON RIGID OR LIGHT

## (undated) DEVICE — APPLIER CLIP ENDO LIGAMAX 5MM

## (undated) DEVICE — TRAY CATH FOL SIL URIMTR 16FR

## (undated) DEVICE — GOWN ECLIPSE REINF LVL4 TWL XL

## (undated) DEVICE — GLOVE SENSICARE PI GRN 7

## (undated) DEVICE — KIT SURGICAL TURNOVER

## (undated) DEVICE — NDL GRANEE OPEN LOOP GRASPER

## (undated) DEVICE — GOWN POLY REINF BRTH SLV XL

## (undated) DEVICE — NDL PNEUMO INSUFFLATI 120MM

## (undated) DEVICE — SUT 0 VICRYL / UR6 (J603)